# Patient Record
Sex: FEMALE | Race: WHITE
[De-identification: names, ages, dates, MRNs, and addresses within clinical notes are randomized per-mention and may not be internally consistent; named-entity substitution may affect disease eponyms.]

---

## 2017-11-21 ENCOUNTER — HOSPITAL ENCOUNTER (OUTPATIENT)
Dept: HOSPITAL 53 - M RAD | Age: 27
End: 2017-11-21
Attending: PHYSICIAN ASSISTANT
Payer: OTHER GOVERNMENT

## 2017-11-21 DIAGNOSIS — T83.32XA: Primary | ICD-10-CM

## 2017-11-21 NOTE — REP
Clinical:  Pelvic pain. Evaluate IUD position .

 

Technique:  Transabdominal pelvic ultrasound followed by transvaginal examination

for better evaluation of the endometrium and adnexa with color Doppler evaluation

of the ovaries.

 

Findings:

 

Bladder is unremarkable and measures 6.6 x 4.1 x 4.4 cm .

 

Normal anteverted uterus measures 8.9 x 3.7 x 5.3 cm .  The endometrial complex

measures 7.0 mm thickness.  No discrete uterine or endometrial abnormalities are

appreciated. IUD identified in central satisfactory position.

 

Bilateral ovaries are normal in appearance and vascularity without evidence for

torsion.  Right ovary measures 2.4 x 2.8 x 2.2 cm ; R I = 0.61.  Left ovary

measures 2.2 x 2.0 x 1.6 cm ; R I = 0.63.

 

No pelvic fluid or adnexal mass lesion.

 

Impression:

Normal pelvic ultrasound.  IUD in satisfactory position.

 

 

Signed by

Domenic Hwang MD 11/21/2017 04:35 P

## 2022-01-12 ENCOUNTER — HOSPITAL ENCOUNTER (OUTPATIENT)
Dept: HOSPITAL 53 - M LABSMTC | Age: 32
End: 2022-01-12
Attending: PEDIATRICS

## 2022-01-12 DIAGNOSIS — Z11.52: Primary | ICD-10-CM

## 2022-01-19 ENCOUNTER — HOSPITAL ENCOUNTER (OUTPATIENT)
Dept: HOSPITAL 53 - M LABSMTC | Age: 32
End: 2022-01-19
Attending: PEDIATRICS

## 2022-01-19 DIAGNOSIS — Z11.52: Primary | ICD-10-CM

## 2023-02-24 ENCOUNTER — HOSPITAL ENCOUNTER (OUTPATIENT)
Dept: HOSPITAL 53 - M EMP | Age: 33
End: 2023-02-24
Attending: FAMILY MEDICINE

## 2023-02-24 DIAGNOSIS — Z11.52: Primary | ICD-10-CM

## 2023-02-28 ENCOUNTER — HOSPITAL ENCOUNTER (OUTPATIENT)
Dept: HOSPITAL 53 - M EMP | Age: 33
End: 2023-02-28
Attending: FAMILY MEDICINE

## 2023-02-28 DIAGNOSIS — Z11.52: Primary | ICD-10-CM
